# Patient Record
Sex: MALE | Race: WHITE | NOT HISPANIC OR LATINO | Employment: PART TIME | URBAN - METROPOLITAN AREA
[De-identification: names, ages, dates, MRNs, and addresses within clinical notes are randomized per-mention and may not be internally consistent; named-entity substitution may affect disease eponyms.]

---

## 2017-01-12 ENCOUNTER — TELEPHONE (OUTPATIENT)
Dept: PEDIATRICS | Facility: CLINIC | Age: 22
End: 2017-01-12

## 2017-01-18 ENCOUNTER — IMMUNIZATION (OUTPATIENT)
Dept: PEDIATRICS | Facility: CLINIC | Age: 22
End: 2017-01-18
Payer: COMMERCIAL

## 2017-01-18 DIAGNOSIS — Z23 NEED FOR HEPATITIS A IMMUNIZATION: Primary | ICD-10-CM

## 2017-01-18 PROCEDURE — 90686 IIV4 VACC NO PRSV 0.5 ML IM: CPT | Mod: S$GLB,,, | Performed by: PEDIATRICS

## 2017-01-18 PROCEDURE — 90632 HEPA VACCINE ADULT IM: CPT | Mod: S$GLB,,, | Performed by: PEDIATRICS

## 2017-01-18 PROCEDURE — 90472 FLU VACCINE (QUAD) GREATER THAN OR EQUAL TO 3YO PRESERVATIVE FREE IM: ICD-10-PCS | Mod: S$GLB,,, | Performed by: PEDIATRICS

## 2017-01-18 PROCEDURE — 90471 HEPATITIS A VACCINE PEDIATRIC / ADOLESCENT 2 DOSE IM: ICD-10-PCS | Mod: S$GLB,,, | Performed by: PEDIATRICS

## 2017-01-18 PROCEDURE — 90471 IMMUNIZATION ADMIN: CPT | Mod: S$GLB,,, | Performed by: PEDIATRICS

## 2017-01-18 PROCEDURE — 90472 IMMUNIZATION ADMIN EACH ADD: CPT | Mod: S$GLB,,, | Performed by: PEDIATRICS

## 2017-01-18 PROCEDURE — 90632 HEPATITIS A VACCINE PEDIATRIC / ADOLESCENT 2 DOSE IM: ICD-10-PCS | Mod: S$GLB,,, | Performed by: PEDIATRICS

## 2017-01-18 PROCEDURE — 90686 FLU VACCINE (QUAD) GREATER THAN OR EQUAL TO 3YO PRESERVATIVE FREE IM: ICD-10-PCS | Mod: S$GLB,,, | Performed by: PEDIATRICS

## 2019-04-20 NOTE — TELEPHONE ENCOUNTER
----- Message from Ryann Hardy sent at 1/12/2017 10:25 AM CST -----  Contact: mother,  tala   Needs hep A  Second and flu injection   Any day after 11 am   Call back    
Returned call. Spoke with mom. Appointment scheduled on 1/18/17 at 115p for second Hep A and Flu  
No